# Patient Record
Sex: MALE | Race: WHITE | Employment: FULL TIME | URBAN - METROPOLITAN AREA
[De-identification: names, ages, dates, MRNs, and addresses within clinical notes are randomized per-mention and may not be internally consistent; named-entity substitution may affect disease eponyms.]

---

## 2021-11-13 ENCOUNTER — HOSPITAL ENCOUNTER (OUTPATIENT)
Age: 56
Discharge: HOME | End: 2021-11-13
Payer: COMMERCIAL

## 2021-11-13 VITALS
SYSTOLIC BLOOD PRESSURE: 107 MMHG | TEMPERATURE: 98 F | DIASTOLIC BLOOD PRESSURE: 59 MMHG | OXYGEN SATURATION: 99 % | RESPIRATION RATE: 18 BRPM | HEART RATE: 82 BPM

## 2021-11-13 DIAGNOSIS — Z20.822 ENCOUNTER FOR LABORATORY TESTING FOR COVID-19 VIRUS: Primary | ICD-10-CM

## 2021-11-13 PROCEDURE — U0002 COVID-19 LAB TEST NON-CDC: HCPCS | Performed by: PHYSICIAN ASSISTANT

## 2021-11-13 PROCEDURE — 99212 OFFICE O/P EST SF 10 MIN: CPT | Performed by: PHYSICIAN ASSISTANT

## 2021-11-13 NOTE — ED PROVIDER NOTES
Patient Seen in: [facility]      History   Patient presents with:  Covid-19 Test    Stated Complaint: Covid Test    Subjective:   HPI    47-year-old male here for Covid testing. Patient had a Covid exposure 10 days ago.   He is asymptomatic SARS-COV-2 BY PCR - Normal         53-year-old male here for Covid testing. Patient had an exposure 10 days ago. He is asymptomatic and offers no complaints.   Covid test negative           MDM                                Disposition and Plan     Clini

## 2021-11-13 NOTE — ED INITIAL ASSESSMENT (HPI)
Pt came in for a covid test. Pt stated he was exposed about 10 days ago. Pt denies any symptoms. Pt has easy non labored respirations. Pt is fully verbal and ambulatory.

## 2024-04-02 ENCOUNTER — APPOINTMENT (OUTPATIENT)
Dept: GENERAL RADIOLOGY | Age: 59
End: 2024-04-02
Attending: PHYSICIAN ASSISTANT
Payer: COMMERCIAL

## 2024-04-02 ENCOUNTER — HOSPITAL ENCOUNTER (OUTPATIENT)
Age: 59
Discharge: HOME | End: 2024-04-02
Payer: COMMERCIAL

## 2024-04-02 VITALS
HEART RATE: 60 BPM | DIASTOLIC BLOOD PRESSURE: 74 MMHG | SYSTOLIC BLOOD PRESSURE: 138 MMHG | OXYGEN SATURATION: 98 % | TEMPERATURE: 97 F | RESPIRATION RATE: 18 BRPM

## 2024-04-02 DIAGNOSIS — R05.1 ACUTE COUGH: ICD-10-CM

## 2024-04-02 DIAGNOSIS — J18.9 PNEUMONIA OF LEFT LOWER LOBE DUE TO INFECTIOUS ORGANISM: Primary | ICD-10-CM

## 2024-04-02 LAB
POCT INFLUENZA A: NEGATIVE
POCT INFLUENZA B: NEGATIVE
S PYO AG THROAT QL: NEGATIVE
SARS-COV-2 RNA RESP QL NAA+PROBE: NOT DETECTED

## 2024-04-02 PROCEDURE — 99214 OFFICE O/P EST MOD 30 MIN: CPT | Performed by: PHYSICIAN ASSISTANT

## 2024-04-02 PROCEDURE — 87880 STREP A ASSAY W/OPTIC: CPT | Performed by: PHYSICIAN ASSISTANT

## 2024-04-02 PROCEDURE — 87502 INFLUENZA DNA AMP PROBE: CPT | Performed by: PHYSICIAN ASSISTANT

## 2024-04-02 PROCEDURE — U0002 COVID-19 LAB TEST NON-CDC: HCPCS | Performed by: PHYSICIAN ASSISTANT

## 2024-04-02 PROCEDURE — 71046 X-RAY EXAM CHEST 2 VIEWS: CPT | Performed by: PHYSICIAN ASSISTANT

## 2024-04-02 RX ORDER — AMOXICILLIN 500 MG/1
1000 TABLET, FILM COATED ORAL 3 TIMES DAILY
Qty: 42 TABLET | Refills: 0 | Status: SHIPPED | OUTPATIENT
Start: 2024-04-02 | End: 2024-04-09

## 2024-04-02 RX ORDER — AZITHROMYCIN 250 MG/1
TABLET, FILM COATED ORAL
Qty: 6 TABLET | Refills: 0 | Status: SHIPPED | OUTPATIENT
Start: 2024-04-02 | End: 2024-04-07

## 2024-04-02 RX ORDER — ALBUTEROL SULFATE 90 UG/1
2 AEROSOL, METERED RESPIRATORY (INHALATION) EVERY 4 HOURS PRN
Qty: 1 EACH | Refills: 0 | Status: SHIPPED | OUTPATIENT
Start: 2024-04-02 | End: 2024-05-02

## 2024-04-03 NOTE — DISCHARGE INSTRUCTIONS
Today you were diagnosed with pneumonia.  Please take antibiotics as prescribed.  Please follow-up with your primary care doctor in 1 week for reevaluation to make sure that pneumonia has resolved.  Take antihistamines for sinus congestion and postnasal drip cough.  At nighttime take 1 to 2 tablets, 25 mg to 50 mg tablets of diphenhydramine (Benadryl) to help with sinus congestion and cough.  You may additionally take 1 tablet of Claritin or Zyrtec during the day to help with sinus congestion.  Take Tylenol (up to 4g in 24 hours) and ibuprofen (400mg-600mg every 8 hours) as needed for pain.  Seek prompt medical reevaluation if you begin to have worsening pain, difficulty swallowing, drooling, shortness of breath or chest pain.

## 2024-04-03 NOTE — ED PROVIDER NOTES
Patient Seen in: [facility]      History     Chief Complaint   Patient presents with    Sore Throat    Headache    Difficulty Breathing     Stated Complaint: cough    Subjective:   HPI    Patient is a 58-year-old male with past medical history of hypertension that presents to immediate care due to cough x 10 days.  Associate symptoms include sinus congestion, sore throat.  Has been taking Mucinex and aspirin at home with mild relief.  Recently returned from vacation in [place].  Notes increased shortness of breath with coughing.  Denying chest pain recent fever.    Objective:   History reviewed. No pertinent past medical history.           No pertinent past surgical history.              No pertinent social history.            Review of Systems    Positive for stated complaint: cough  Other systems are as noted in HPI.  Constitutional and vital signs reviewed.      All other systems reviewed and negative except as noted above.    Physical Exam     ED Triage Vitals [04/02/24 1917]   /74   Pulse 60   Resp 18   Temp 97.3 °F (36.3 °C)   Temp src Temporal   SpO2 98 %   O2 Device None (Room air)       Current:/74   Pulse 60   Temp 97.3 °F (36.3 °C) (Temporal)   Resp 18   SpO2 98%         Physical Exam    Vital signs reviewed. Nursing note reviewed.  Constitutional: Well-developed. Well-nourished. In no acute distress  HENT: Mucous membranes moist. TMs intact bilaterally. No trismus. Uvula midline. Mild posterior pharynx erythema.  No petechiae, exudates, or posterior pharynx edema.  EYES: No scleral icterus or conjunctival injection.  NECK: Full ROM. Supple.   CARDIAC: Normal rate. Normal S1/ S2. 2+ distal pulses. No edema  PULM/CHEST: Clear to auscultation bilaterally. No wheezes  Extremities: Full ROM  NEURO: Awake, alert, following commands, moving extremities, answering questions.   SKIN: Warm and dry. No rash or lesions.  PSYCH: Normal judgment. Normal affect.        ED Course     Labs  Reviewed   POCT RAPID STREP - Normal   POCT FLU TEST - Normal    Narrative:     This assay is a rapid molecular in vitro test utilizing nucleic acid amplification of influenza A and B viral RNA.   RAPID SARS-COV-2 BY PCR - Normal                      MDM      Patient is a 58-year-old male with past medical history of hypertension that presents to immediate care due to cough x 10 days.  Patient arrives with stable vitals sitting comfortably speaking complete sentences in no respiratory distress.  Physical exam showing faint expiratory wheezes of left lower lobe.  Will obtain chest x-ray to rule out pneumonia versus bronchitis.  Will additionally test for COVID-19 influenza and strep pharyngitis.  History given by patient          8:03 PM  Discussed negative strep, COVID-19, influenza testing today in immediate care.  Chest x-ray showing left lower lobe pneumonia.  Will prescribe azithromycin, high-dose amoxicillin.  Will additionally prescribe albuterol inhaler.  Encouraged Tylenol ibuprofen as needed for pain.  Return precautions including worsening cough, worsening shortness of breath chest pain difficulty breathing.  Patient expressed understanding all questions answered.  Encourage PCP follow-up 1 week for reevaluation for resolvent of pneumonia                         Medical Decision Making      Disposition and Plan     Clinical Impression:  1. Pneumonia of left lower lobe due to infectious organism    2. Acute cough         Disposition:  Discharge  4/2/2024  8:00 pm    Follow-up:  Adelina Nieto MD  [address]  [place] [id number] [phone]    Schedule an appointment as soon as possible for a visit             Medications Prescribed:  Discharge Medication List as of 4/2/2024  8:03 PM        START taking these medications    Details   azithromycin (ZITHROMAX Z-LEESA) 250 MG Oral Tab 500 mg once followed by 250 mg daily x 4 days, Normal, Disp-6 tablet, R-0      amoxicillin 500 MG Oral Tab  Take 2 tablets (1,000 mg total) by mouth 3 (three) times daily for 7 days., Normal, Disp-42 tablet, R-0      albuterol 108 (90 Base) MCG/ACT Inhalation Aero Soln Inhale 2 puffs into the lungs every 4 (four) hours as needed for Wheezing., Normal, Disp-1 each, R-0      Spacer/Aero-Hold Chamber Mask Does not apply Misc Use with inhaler as needed, Normal, Disp-1 each, R-0

## 2024-04-03 NOTE — ED INITIAL ASSESSMENT (HPI)
Patient reports having a cough, headache, nasal congestion, fatigue, and some difficulty breathing that started roughly on 3/23. Patient went on vacation to [place] and now feels worse than last week. Patient has been taking Mucinex and Aspirin.

## 2024-12-09 ENCOUNTER — APPOINTMENT (OUTPATIENT)
Dept: GENERAL RADIOLOGY | Age: 59
End: 2024-12-09
Attending: NURSE PRACTITIONER
Payer: COMMERCIAL

## 2024-12-09 ENCOUNTER — HOSPITAL ENCOUNTER (OUTPATIENT)
Age: 59
Discharge: HOME | End: 2024-12-09
Payer: COMMERCIAL

## 2024-12-09 VITALS
DIASTOLIC BLOOD PRESSURE: 74 MMHG | RESPIRATION RATE: 20 BRPM | OXYGEN SATURATION: 100 % | TEMPERATURE: 100 F | HEART RATE: 62 BPM | SYSTOLIC BLOOD PRESSURE: 140 MMHG

## 2024-12-09 DIAGNOSIS — R05.1 ACUTE COUGH: ICD-10-CM

## 2024-12-09 DIAGNOSIS — J06.9 VIRAL UPPER RESPIRATORY TRACT INFECTION: Primary | ICD-10-CM

## 2024-12-09 PROCEDURE — 71046 X-RAY EXAM CHEST 2 VIEWS: CPT | Performed by: NURSE PRACTITIONER

## 2024-12-09 PROCEDURE — 99203 OFFICE O/P NEW LOW 30 MIN: CPT | Performed by: NURSE PRACTITIONER

## 2024-12-09 RX ORDER — BENZONATATE 200 MG/1
200 CAPSULE ORAL 3 TIMES DAILY PRN
Qty: 30 CAPSULE | Refills: 0 | Status: SHIPPED | OUTPATIENT
Start: 2024-12-09 | End: 2025-01-08

## 2024-12-09 RX ORDER — LINACLOTIDE 72 UG/1
1 CAPSULE, GELATIN COATED ORAL
COMMUNITY
Start: 2021-11-02

## 2024-12-09 RX ORDER — OLMESARTAN MEDOXOMIL 20 MG/1
20 TABLET ORAL DAILY
COMMUNITY

## 2024-12-09 RX ORDER — ATORVASTATIN CALCIUM 40 MG/1
40 TABLET, FILM COATED ORAL DAILY
COMMUNITY

## 2024-12-09 NOTE — ED INITIAL ASSESSMENT (HPI)
Pt states having a cough with congestion that began last Monday. Pt states had pneumonia in may and states feels similar. Pt states having SOB and chills. Pt denies NVD.

## 2024-12-10 NOTE — ED PROVIDER NOTES
Patient Seen in: [facility]      History     Chief Complaint   Patient presents with    Cough/URI     Stated Complaint: Cough; Congestion    Subjective:   Well-appearing 59-year-old male with essential hypertension and hyperlipidemia presents with complaints of a nonproductive cough and chest congestion since this past Monday.  Patient communicates that in April of this year when diagnosed with pneumonia he had similar symptoms.  Patient does communicate intermittent shortness of breath and chills.  Patient communicates also feeling feverish intermittently.  Patient denies chest pain.  Patient communicates that he has taken 2 doses of Mucinex and has not noticed any improvement in symptoms.                   Objective:     History reviewed. No pertinent past medical history.           History reviewed. No pertinent surgical history.             No pertinent social history.            Review of Systems    Positive for stated complaint: Cough; Congestion  Other systems are as noted in HPI.  Constitutional and vital signs reviewed.      All other systems reviewed and negative except as noted above.    Physical Exam     ED Triage Vitals [12/09/24 1724]   /74   Pulse 62   Resp 20   Temp 99.5 °F (37.5 °C)   Temp src Oral   SpO2 100 %   O2 Device None (Room air)       Current Vitals:   Vital Signs  BP: 140/74  Pulse: 62  Resp: 20  Temp: 99.5 °F (37.5 °C)  Temp src: Oral    Oxygen Therapy  SpO2: 100 %  O2 Device: None (Room air)        Physical Exam  VS: Vital signs reviewed. 02 saturation within normal limits for this patient.    General: Patient is awake and alert, oriented to person, place and time. Pt appears non-toxic.     HEENT: Head is normocephalic, atraumatic. Nonicteric sclera, no conjunctival injection. No facial droop or slurred speech. No oral lesions or pallor. Mucous membranes moist.      Mouth/Throat:      Lips: Pink.      Mouth: Mucous membranes are moist.      Pharynx: Oropharynx is  clear.     Neck: No cervical lymphadenopathy. Supple. Normal ROM.    Heart: Regular rate and rhythm, normal S1, normal S2.    Lungs: Clear to auscultation. Good inspiratory effort. + Airway entry bilaterally without wheezes, rhonchi or crackles. No accessory muscle use or tachypnea.    Extremities: No focal swelling or tenderness. Capillary refill noted.     Skin: Warm, dry and normal in color.     Psychiatric: Normal affect, judgement normal, insight normal.     CNS: Moves all 4 extremities. Interacts appropriately. No gait abnormality. Memory normal.        ED Course   Labs Reviewed - No data to display   PROCEDURE: XR CHEST PA + LAT CHEST (CPT=71046)     COMPARISON: [facility] in [place], XR CHEST PA + LAT CHEST (GCC=[id number]), 4/02/2024, 7:22 PM.     INDICATIONS: Cough ongoing for 1 week.     TECHNIQUE:   Two views.       FINDINGS:  CARDIAC/VASC: The cardiomediastinal silhouette is not enlarged. There is mild tortuosity of the thoracic aorta with peripheral atherosclerotic vascular calcification. The pulmonary vascularity is within normal limits.  MEDIAST/PAULIE: No visible mass or adenopathy.  LUNGS/PLEURA: Elevation/eventration right hemidiaphragm is perceived. Left lower lobe pneumonia appears to have improved in the interim. No airspace consolidation, pleural effusion, or pneumothorax is evident.    BONES: Mild degenerative changes of the thoracic spine are apparent. There is no fracture or visible bony lesion.      Impression  CONCLUSION:  Negative for radiographically evident acute intrathoracic process.      Dictated by (CST): Myke Machuca MD on 12/09/2024 at 6:40 PM      Finalized by (CST): Myke Machuca MD on 12/09/2024 at 6:40 PM    [facility]   Medical Decision Making  Well-appearing.  Chest x-ray is negative for radiographically evident acute intrathoracic process.  I independently reviewed the chest x-ray.  Prescription for benzonatate was sent to pharmacy on file for cough.  I discussed  over-the-counter cough and cold medications for symptoms.  I discussed caution with overlapping medications.  Differential diagnosis considered included pneumonia versus bronchitis versus URI.  PMD follow-up as well as return precautions discussed.    Problems Addressed:  Acute cough: acute illness or injury  Viral upper respiratory tract infection: acute illness or injury    Amount and/or Complexity of Data Reviewed  Radiology: ordered and independent interpretation performed. Decision-making details documented in ED Course.    Risk  OTC drugs.  Prescription drug management.        Disposition and Plan     Clinical Impression:  1. Viral upper respiratory tract infection    2. Acute cough         Disposition:  Discharge  12/9/2024  6:49 pm    Follow-up:  Nyasia Aguilera MD  [address] [address]  [phone]    In 1 week  As needed          Medications Prescribed:  Discharge Medication List as of 12/9/2024  7:01 PM        START taking these medications    Details   benzonatate 200 MG Oral Cap Take 1 capsule (200 mg total) by mouth 3 (three) times daily as needed for cough., Normal, Disp-30 capsule, R-0                 Supplementary Documentation: